# Patient Record
Sex: FEMALE | Race: ASIAN
[De-identification: names, ages, dates, MRNs, and addresses within clinical notes are randomized per-mention and may not be internally consistent; named-entity substitution may affect disease eponyms.]

---

## 2018-11-02 ENCOUNTER — HOSPITAL ENCOUNTER (EMERGENCY)
Dept: HOSPITAL 61 - ER | Age: 43
Discharge: HOME | End: 2018-11-02
Payer: COMMERCIAL

## 2018-11-02 VITALS — HEIGHT: 63 IN | WEIGHT: 133.44 LBS | BODY MASS INDEX: 23.64 KG/M2

## 2018-11-02 VITALS — SYSTOLIC BLOOD PRESSURE: 140 MMHG | DIASTOLIC BLOOD PRESSURE: 94 MMHG

## 2018-11-02 DIAGNOSIS — R11.2: ICD-10-CM

## 2018-11-02 DIAGNOSIS — Z88.7: ICD-10-CM

## 2018-11-02 DIAGNOSIS — R10.13: Primary | ICD-10-CM

## 2018-11-02 DIAGNOSIS — R42: ICD-10-CM

## 2018-11-02 LAB
ALBUMIN SERPL-MCNC: 3.4 G/DL (ref 3.4–5)
ALBUMIN/GLOB SERPL: 0.8 {RATIO} (ref 1–1.7)
ALP SERPL-CCNC: 72 U/L (ref 46–116)
ALT SERPL-CCNC: 25 U/L (ref 14–59)
ANION GAP SERPL CALC-SCNC: 8 MMOL/L (ref 6–14)
APTT PPP: YELLOW S
AST SERPL-CCNC: 14 U/L (ref 15–37)
BACTERIA #/AREA URNS HPF: 0 /HPF
BASOPHILS # BLD AUTO: 0 X10^3/UL (ref 0–0.2)
BASOPHILS NFR BLD: 0 % (ref 0–3)
BILIRUB SERPL-MCNC: 0.8 MG/DL (ref 0.2–1)
BILIRUB UR QL STRIP: NEGATIVE
BUN SERPL-MCNC: 17 MG/DL (ref 7–20)
BUN/CREAT SERPL: 34 (ref 6–20)
CALCIUM SERPL-MCNC: 9.3 MG/DL (ref 8.5–10.1)
CHLORIDE SERPL-SCNC: 103 MMOL/L (ref 98–107)
CO2 SERPL-SCNC: 27 MMOL/L (ref 21–32)
CREAT SERPL-MCNC: 0.5 MG/DL (ref 0.6–1)
EOSINOPHIL NFR BLD: 0.2 X10^3/UL (ref 0–0.7)
EOSINOPHIL NFR BLD: 2 % (ref 0–3)
ERYTHROCYTE [DISTWIDTH] IN BLOOD BY AUTOMATED COUNT: 13.1 % (ref 11.5–14.5)
FIBRINOGEN PPP-MCNC: CLEAR MG/DL
GFR SERPLBLD BASED ON 1.73 SQ M-ARVRAT: 134.7 ML/MIN
GLOBULIN SER-MCNC: 4.4 G/DL (ref 2.2–3.8)
GLUCOSE SERPL-MCNC: 92 MG/DL (ref 70–99)
HCT VFR BLD CALC: 40.2 % (ref 36–47)
HGB BLD-MCNC: 14.2 G/DL (ref 12–15.5)
LIPASE: 148 U/L (ref 73–393)
LYMPHOCYTES # BLD: 2.5 X10^3/UL (ref 1–4.8)
LYMPHOCYTES NFR BLD AUTO: 26 % (ref 24–48)
MAGNESIUM SERPL-MCNC: 1.9 MG/DL (ref 1.8–2.4)
MCH RBC QN AUTO: 31 PG (ref 25–35)
MCHC RBC AUTO-ENTMCNC: 35 G/DL (ref 31–37)
MCV RBC AUTO: 88 FL (ref 79–100)
MONO #: 0.7 X10^3/UL (ref 0–1.1)
MONOCYTES NFR BLD: 8 % (ref 0–9)
NEUT #: 6.2 X10^3UL (ref 1.8–7.7)
NEUTROPHILS NFR BLD AUTO: 64 % (ref 31–73)
NITRITE UR QL STRIP: NEGATIVE
PH UR STRIP: 6.5 [PH]
PLATELET # BLD AUTO: 279 X10^3/UL (ref 140–400)
POTASSIUM SERPL-SCNC: 3.7 MMOL/L (ref 3.5–5.1)
PROT SERPL-MCNC: 7.8 G/DL (ref 6.4–8.2)
PROT UR STRIP-MCNC: NEGATIVE MG/DL
RBC # BLD AUTO: 4.58 X10^6/UL (ref 3.5–5.4)
RBC #/AREA URNS HPF: 0 /HPF (ref 0–2)
SODIUM SERPL-SCNC: 138 MMOL/L (ref 136–145)
SQUAMOUS #/AREA URNS LPF: (no result) /LPF
U PREG PATIENT: NEGATIVE
UROBILINOGEN UR-MCNC: 1 MG/DL
WBC # BLD AUTO: 9.7 X10^3/UL (ref 4–11)
WBC #/AREA URNS HPF: 0 /HPF (ref 0–4)

## 2018-11-02 PROCEDURE — 83735 ASSAY OF MAGNESIUM: CPT

## 2018-11-02 PROCEDURE — 84484 ASSAY OF TROPONIN QUANT: CPT

## 2018-11-02 PROCEDURE — 96375 TX/PRO/DX INJ NEW DRUG ADDON: CPT

## 2018-11-02 PROCEDURE — 81001 URINALYSIS AUTO W/SCOPE: CPT

## 2018-11-02 PROCEDURE — 74177 CT ABD & PELVIS W/CONTRAST: CPT

## 2018-11-02 PROCEDURE — 36415 COLL VENOUS BLD VENIPUNCTURE: CPT

## 2018-11-02 PROCEDURE — 83605 ASSAY OF LACTIC ACID: CPT

## 2018-11-02 PROCEDURE — 99285 EMERGENCY DEPT VISIT HI MDM: CPT

## 2018-11-02 PROCEDURE — 85025 COMPLETE CBC W/AUTO DIFF WBC: CPT

## 2018-11-02 PROCEDURE — 96361 HYDRATE IV INFUSION ADD-ON: CPT

## 2018-11-02 PROCEDURE — 81025 URINE PREGNANCY TEST: CPT

## 2018-11-02 PROCEDURE — 96374 THER/PROPH/DIAG INJ IV PUSH: CPT

## 2018-11-02 PROCEDURE — 80053 COMPREHEN METABOLIC PANEL: CPT

## 2018-11-02 PROCEDURE — 93005 ELECTROCARDIOGRAM TRACING: CPT

## 2018-11-02 PROCEDURE — 83690 ASSAY OF LIPASE: CPT

## 2018-11-02 NOTE — EKG
[facility]

              [address] [place] [address]

Test Date:    2018               Test Time:    14:04:19

Pat Name:     SUZE FONTENOT                 Department:   

Patient ID:   [id number]           Room:          

Gender:       F                        Technician:   

:          1975               Requested By: HENRIK MARTINS

Order Number: [id number]           Reading MD:   Nomi Tom MD

                                 Measurements

Intervals                              Axis          

Rate:         64                       P:            -41

IN:           128                      QRS:          60

QRSD:         82                       T:            24

QT:           396                                    

QTc:          413                                    

                           Interpretive Statements

SINUS RHYTHM



Electronically Signed On 2018 14:05:21 CST by Nomi Tom MD

## 2018-11-02 NOTE — PHYS DOC
Past Medical History


Past Medical History:  No Pertinent History


Past Surgical History:  


Additional Past Surgical Histo:   X2


Drug Use:  None





Adult General


Chief Complaint


Chief Complaint:  ABDOMINAL PAIN





HPI


HPI





42 y/o female presents to ER for c/o mid epigastric pain x3 days. Pt speaks 

primarily Nigerien so  Christos is providing some information. Translation 

phone used for communication. Patient's  wanted to be the one using an 

translation phone so he communicated with his wife and then via the phone. 

Patient has had 4 episodes of vomiting since last night. Patient had regular 

bowel movement last night denying any dark tarry or bloody stools. Patient 

denies any urinary or vaginal symptoms. Patient denies any other family members 

with similar illnesses. Patient denies fever, chest pain, shortness of air, or 

swelling in extremities. She reports she did feel lightheaded at work denying 

fall or LOC.





LMP 10/3/18. Denies any recent travel.





Review of Systems


Review of Systems





Constitutional: Denies fever or chills []


Eyes: Denies change in visual acuity, redness, or eye pain []


HENT: Denies nasal congestion or sore throat []


Respiratory: Denies cough or shortness of breath []


Cardiovascular: Denies CP/palpitations


GI: Denies bloody stools or diarrhea. Reports mid epig. pain with intermittent 

nausea and vomiting 


: Denies dysuria or hematuria []


Musculoskeletal: Denies back/neck pain or joint pain []


Integument: Denies rash,swelling or skin lesions []


Neurologic: Denies headache, focal weakness or sensory changes. Reports feeling 

light-headed at work denying LOC or fall





All other systems were reviewed and found to be within normal limits, except as 

documented in this note.





Current Medications


Current Medications





Current Medications








 Medications


  (Trade)  Dose


 Ordered  Sig/Fransico  Start Time


 Stop Time Status Last Admin


Dose Admin


 


 Fentanyl Citrate


  (Fentanyl 2ml


 Vial)  25 mcg  1X  ONCE  18 14:15


 18 14:16 DC 18 14:19


25 MCG


 


 Iohexol


  (Omnipaque 300


 Mg/ml)  75 ml  1X  ONCE  18 15:00


 18 15:01 DC 18 15:01


75 ML


 


 Ondansetron HCl


  (Zofran)  4 mg  1X  ONCE  18 14:15


 18 14:16 DC 18 14:18


4 MG


 


 Sodium Chloride  1,000 ml @ 


 1,000 mls/hr  1X  ONCE  18 13:45


 18 14:44 DC 18 14:02


1,000 MLS/HR











Allergies


Allergies





Allergies








Coded Allergies Type Severity Reaction Last Updated Verified


 


  tuberculin,PPD,multi-puncture Allergy Intermediate  18 Yes











Physical Exam


Physical Exam





Constitutional: Well developed, well nourished, no acute distress, non-toxic 

appearance. []


HENT: Normocephalic, atraumatic, mucous membranes pink/dry, no oral exudates, 

nose normal. []


Eyes: pupils equal, conjunctiva normal, no discharge. [] 


Neck: Normal range of motion, no tenderness, supple


Cardiovascular:Heart rate regular rhythm, no murmur []


Lungs & Thorax:  Bilateral breath sounds clear to auscultation. Resp. equal/

nonlabored


Abdomen: Bowel sounds normal, soft-nondistended with no rigidity, tender to 

palpation mid epigastric, no masses, no pulsatile masses. No rebound tenderness[

] 


Skin: Warm, dry, no erythema, no rash. [] 


Back: No tenderness, no CVA tenderness. [] 


Extremities: No tenderness, no cyanosis, no clubbing, ROM intact, no edema. [] 


Neurologic: Alert and oriented X 3, normal motor function, normal sensory 

function, no focal deficits noted. []


Psychologic: Affect normal, judgement normal, mood normal. []





Current Patient Data


Vital Signs





 Vital Signs








  Date Time  Temp Pulse Resp B/P (MAP) Pulse Ox O2 Delivery O2 Flow Rate FiO2


 


18 16:28  66 17 140/94 (109) 98 Room Air  


 


18 13:32 98.2       





 98.2       








Lab Values





 Laboratory Tests








Test


 18


13:40 18


13:48 18


14:05


 


Urine Collection Type Unknown    


 


Urine Color Yellow    


 


Urine Clarity Clear    


 


Urine pH 6.5    


 


Urine Specific Gravity >=1.030    


 


Urine Protein


 Negative mg/dL


(NEG-TRACE) 


 





 


Urine Glucose (UA)


 Negative mg/dL


(NEG) 


 





 


Urine Ketones (Stick)


 Negative mg/dL


(NEG) 


 





 


Urine Blood


 Negative (NEG)


 


 





 


Urine Nitrite


 Negative (NEG)


 


 





 


Urine Bilirubin


 Negative (NEG)


 


 





 


Urine Urobilinogen Dipstick


 1.0 mg/dL (0.2


mg/dL) 


 





 


Urine Leukocyte Esterase


 Negative (NEG)


 


 





 


Urine RBC 0 /HPF (0-2)    


 


Urine WBC 0 /HPF (0-4)    


 


Urine Squamous Epithelial


Cells Few /LPF  


 


 





 


Urine Bacteria


 0 /HPF (0-FEW)


 


 





 


Urine Mucus Slight /LPF    


 


Urine Pregnancy Test


 Negative (NEG)


 


 





 


White Blood Count


 


 9.7 x10^3/uL


(4.0-11.0) 





 


Red Blood Count


 


 4.58 x10^6/uL


(3.50-5.40) 





 


Hemoglobin


 


 14.2 g/dL


(12.0-15.5) 





 


Hematocrit


 


 40.2 %


(36.0-47.0) 





 


Mean Corpuscular Volume


 


 88 fL ()


 





 


Mean Corpuscular Hemoglobin  31 pg (25-35)   


 


Mean Corpuscular Hemoglobin


Concent 


 35 g/dL


(31-37) 





 


Red Cell Distribution Width


 


 13.1 %


(11.5-14.5) 





 


Platelet Count


 


 279 x10^3/uL


(140-400) 





 


Neutrophils (%) (Auto)  64 % (31-73)   


 


Lymphocytes (%) (Auto)  26 % (24-48)   


 


Monocytes (%) (Auto)  8 % (0-9)   


 


Eosinophils (%) (Auto)  2 % (0-3)   


 


Basophils (%) (Auto)  0 % (0-3)   


 


Neutrophils # (Auto)


 


 6.2 x10^3uL


(1.8-7.7) 





 


Lymphocytes # (Auto)


 


 2.5 x10^3/uL


(1.0-4.8) 





 


Monocytes # (Auto)


 


 0.7 x10^3/uL


(0.0-1.1) 





 


Eosinophils # (Auto)


 


 0.2 x10^3/uL


(0.0-0.7) 





 


Basophils # (Auto)


 


 0.0 x10^3/uL


(0.0-0.2) 





 


Sodium Level


 


 138 mmol/L


(136-145) 





 


Potassium Level


 


 3.7 mmol/L


(3.5-5.1) 





 


Chloride Level


 


 103 mmol/L


() 





 


Carbon Dioxide Level


 


 27 mmol/L


(21-32) 





 


Anion Gap  8 (6-14)   


 


Blood Urea Nitrogen


 


 17 mg/dL


(7-20) 





 


Creatinine


 


 0.5 mg/dL


(0.6-1.0)  L 





 


Estimated GFR


(Cockcroft-Gault) 


 134.7  


 





 


BUN/Creatinine Ratio  34 (6-20)  H 


 


Glucose Level


 


 92 mg/dL


(70-99) 





 


Calcium Level


 


 9.3 mg/dL


(8.5-10.1) 





 


Magnesium Level


 


 1.9 mg/dL


(1.8-2.4) 





 


Total Bilirubin


 


 0.8 mg/dL


(0.2-1.0) 





 


Aspartate Amino Transferase


(AST) 


 14 U/L (15-37)


L 





 


Alanine Aminotransferase (ALT)


 


 25 U/L (14-59)


 





 


Alkaline Phosphatase


 


 72 U/L


() 





 


Troponin I Quantitative


 


 < 0.017 ng/mL


(0.000-0.055) 





 


Total Protein


 


 7.8 g/dL


(6.4-8.2) 





 


Albumin


 


 3.4 g/dL


(3.4-5.0) 





 


Albumin/Globulin Ratio


 


 0.8 (1.0-1.7)


L 





 


Lipase


 


 148 U/L


() 





 


Lactic Acid Level


 


 


 0.4 mmol/L


(0.4-2.0)





 Laboratory Tests


18 13:48








 Laboratory Tests


18 13:48











EKG


EKG


EKG obtained 18 at 1404


Interpreted by ER physician





Sinus rhythm


Rate 64


No STEMI





Radiology/Procedures


Radiology/Procedures


PROCEDURE: CT ABD PELV W/ IV CONTRST ONLY





CT ABD PELV W/ IV CONTRST ONLY


 


Indication: EPIGASTRIC ABD PAIN X 3 DAYS INJ 75ML OMNI 300 NO PREV 


 


Exposure: One or more of the following individualized dose reduction 


techniques were utilized for this examination:  1. Automated exposure 


control  2. Adjustment of the mA and/or kV according to patient size  3. 


Use of iterative reconstruction technique.


 


Comparison: None are available.


Contrast: Intravenous contrast was given. No oral contrast per request.


 


FINDINGS:


Lower thorax: Mild groundglass opacities identified in both lung bases.


 


Liver: Unremarkable


Spleen: Unremarkable


Pancreas: Unremarkable


 


Adrenals: No evidence of mass.


Kidneys: No obvious mass.


Urinary tracts: No hydronephrosis.


 


Gallbladder: No calcified stone


Lymph nodes: No significant enlargement


 


Vessels: Aorta is nonaneurysmal.


 


GI tract: No evidence of acute colitis. No evidence of bowel obstruction.


Appendix is normal.


 


Reproductive organs: Diffuse hypodense tissue within the endometrial canal


which measures about 2 cm. Small area of focal hypodense tissue posterior 


to the uterus on the right may represent the adnexa.


Urinary bladder: Unremarkable.


Peritoneum: No evidence of pneumoperitoneum. No free fluid.


Abdominal wall:Unremarkable


 


Spine: Vertebral body height and alignment are intact.


Bones: No destructive process identified.


 


IMPRESSION: 


1. Mild groundglass opacities in both lung bases, nonspecific but may be 


indicative of an inflammatory or infectious process.


2. Endometrial canal distention/hypodensity. This could indicate late 


proliferative or secretory phase of the menstrual cycle, but other process


such as endometritis, endometrial hemorrhage or endometrial mass is 


possible. Recommend pelvic ultrasound for further evaluation.


 


Electronically signed by: Adrien Barroso MD (2018 3:37 PM) [facility]














DICTATED and SIGNED BY:     ADRIEN BARROSO MD


DATE:     18 4903





Course & Med Decision Making


Course & Med Decision Making


Pertinent Labs and Imaging studies reviewed. (See chart for details)





Patient reports her symptoms have improved. Patient has had no vomiting while 

in the ER and on reexam she reports she is feeling hungry. Discussed test 

results with patient's  translating at this time which he feels 

comfortable with versus using translation phone again. Patient remains nontoxic 

in appearance and in no visible distress during reexam. Discussed CT results 

with no acute findings for obstruction, colitis, or abnormal appendix findings. 

Patient's labs were unremarkable. EKG with no acute ST elevation or STEMI and 

troponin <0.017. UCG negative with UA negative for leukocytes, nitrates, blood, 

or ketones. Discussed with improved symptoms plans were for home discharge and 

if symptoms persist or with any concerns patient to follow-up with her primary 

care physician in next 3-5 days. Will provide Zofran ODT prescription with 

discharge paperwork. Education provided on signs and symptoms to return to ER 

for. Discussed bland diet and slowly advancing as tolerated. Discharge 

instructions were discussed. Both patient and her  feel comfortable with 

discharge as discussed. Patient is smiling during discharge discussion.





CT results noted "Endometrial canal distention/hypodensity"- patient denies any 

pelvic pain or pressure, vaginal bleeding, or vaginal discharge. Patient is due 

to start her menstrual cycle as her LMP was 10/3/18.





Pt's case and plan of care was discussed with Dr. Tinajero. 














Staff Physician Addendum:


I was working in the ER during the course of this patient's visit.  I was 

available for consultation as needed, but I was not directly involved in the 

care of this patient.





Dragon Disclaimer


Dragon Disclaimer


This electronic medical record was generated, in whole or in part, using a 

voice recognition dictation system.





Departure


Departure


Impression:  


 Primary Impression:  


 Abdominal pain


 Additional Impression:  


 Nausea & vomiting


Disposition:  01 HOME, SELF-CARE


Condition:  STABLE


Referrals:  


FANG JOHNSON (PCP)


Patient Instructions:  Abdominal Pain (Nonspecific), Nausea and Vomiting





Additional Instructions:  


Drink plenty of water. 





If symptoms continue follow-up with your primary doctor in next 3-5 days for re-

evaluation.


Scripts


Ondansetron (ZOFRAN ODT) 4 Mg Tab.rapdis


1 TAB SL Q8HRS, #10 TAB


   for nausea/vomiting


   Prov: HENRIK MARTINS         18





Problem Qualifiers











HENRIK MARTINS 2018 13:49


RODERICK TINAJERO MD 2018 06:46

## 2018-11-02 NOTE — RAD
CT ABD PELV W/ IV CONTRST ONLY

 

Indication: EPIGASTRIC ABD PAIN X 3 DAYS INJ 75ML OMNI 300 NO PREV 

 

Exposure: One or more of the following individualized dose reduction 

techniques were utilized for this examination:  1. Automated exposure 

control  2. Adjustment of the mA and/or kV according to patient size  3. 

Use of iterative reconstruction technique.

 

Comparison: None are available.

Contrast: Intravenous contrast was given. No oral contrast per request.

 

FINDINGS:

Lower thorax: Mild groundglass opacities identified in both lung bases.

 

Liver: Unremarkable

Spleen: Unremarkable

Pancreas: Unremarkable

 

Adrenals: No evidence of mass.

Kidneys: No obvious mass.

Urinary tracts: No hydronephrosis.

 

Gallbladder: No calcified stone

Lymph nodes: No significant enlargement

 

Vessels: Aorta is nonaneurysmal.

 

GI tract: No evidence of acute colitis. No evidence of bowel obstruction.

Appendix is normal.

 

Reproductive organs: Diffuse hypodense tissue within the endometrial canal

which measures about 2 cm. Small area of focal hypodense tissue posterior 

to the uterus on the right may represent the adnexa.

Urinary bladder: Unremarkable.

Peritoneum: No evidence of pneumoperitoneum. No free fluid.

Abdominal wall:Unremarkable

 

Spine: Vertebral body height and alignment are intact.

Bones: No destructive process identified.

 

IMPRESSION: 

1. Mild groundglass opacities in both lung bases, nonspecific but may be 

indicative of an inflammatory or infectious process.

2. Endometrial canal distention/hypodensity. This could indicate late 

proliferative or secretory phase of the menstrual cycle, but other process

such as endometritis, endometrial hemorrhage or endometrial mass is 

possible. Recommend pelvic ultrasound for further evaluation.

 

Electronically signed by: Adrien Barroso MD (11/2/2018 3:37 PM) [facility]